# Patient Record
Sex: FEMALE | Race: WHITE | NOT HISPANIC OR LATINO | Employment: OTHER | URBAN - METROPOLITAN AREA
[De-identification: names, ages, dates, MRNs, and addresses within clinical notes are randomized per-mention and may not be internally consistent; named-entity substitution may affect disease eponyms.]

---

## 2022-04-06 ENCOUNTER — OFFICE VISIT (OUTPATIENT)
Dept: NEPHROLOGY | Facility: CLINIC | Age: 72
End: 2022-04-06
Payer: MEDICARE

## 2022-04-06 VITALS
SYSTOLIC BLOOD PRESSURE: 114 MMHG | BODY MASS INDEX: 41.45 KG/M2 | HEART RATE: 62 BPM | OXYGEN SATURATION: 96 % | WEIGHT: 233.94 LBS | HEIGHT: 63 IN | TEMPERATURE: 97 F | DIASTOLIC BLOOD PRESSURE: 68 MMHG

## 2022-04-06 DIAGNOSIS — N25.81 SECONDARY HYPERPARATHYROIDISM OF RENAL ORIGIN: ICD-10-CM

## 2022-04-06 DIAGNOSIS — N04.9 NEPHROTIC SYNDROME: ICD-10-CM

## 2022-04-06 DIAGNOSIS — N18.32 ANEMIA IN STAGE 3B CHRONIC KIDNEY DISEASE: ICD-10-CM

## 2022-04-06 DIAGNOSIS — E11.29 TYPE 2 DIABETES MELLITUS WITH OTHER DIABETIC KIDNEY COMPLICATION, WITHOUT LONG-TERM CURRENT USE OF INSULIN: ICD-10-CM

## 2022-04-06 DIAGNOSIS — E87.5 HYPERKALEMIA: ICD-10-CM

## 2022-04-06 DIAGNOSIS — I10 ESSENTIAL HYPERTENSION: ICD-10-CM

## 2022-04-06 DIAGNOSIS — I13.10 CARDIORENAL SYNDROME WITH RENAL FAILURE, STAGE 1-4 OR UNSPECIFIED CHRONIC KIDNEY DISEASE, WITHOUT HEART FAILURE: ICD-10-CM

## 2022-04-06 DIAGNOSIS — M1A.0720 IDIOPATHIC CHRONIC GOUT OF LEFT FOOT WITHOUT TOPHUS: ICD-10-CM

## 2022-04-06 DIAGNOSIS — N18.32 STAGE 3B CHRONIC KIDNEY DISEASE: Primary | ICD-10-CM

## 2022-04-06 DIAGNOSIS — D63.1 ANEMIA IN STAGE 3B CHRONIC KIDNEY DISEASE: ICD-10-CM

## 2022-04-06 PROCEDURE — 99214 OFFICE O/P EST MOD 30 MIN: CPT | Mod: ,,, | Performed by: INTERNAL MEDICINE

## 2022-04-06 PROCEDURE — 99214 PR OFFICE/OUTPT VISIT, EST, LEVL IV, 30-39 MIN: ICD-10-PCS | Mod: ,,, | Performed by: INTERNAL MEDICINE

## 2022-04-06 RX ORDER — ROSUVASTATIN CALCIUM 10 MG/1
10 TABLET, COATED ORAL NIGHTLY
COMMUNITY
Start: 2022-02-05

## 2022-04-06 RX ORDER — FUROSEMIDE 80 MG/1
80 TABLET ORAL DAILY
COMMUNITY
Start: 2021-11-13 | End: 2022-04-06 | Stop reason: ALTCHOICE

## 2022-04-06 RX ORDER — METOPROLOL TARTRATE 25 MG/1
25 TABLET, FILM COATED ORAL 2 TIMES DAILY
COMMUNITY
Start: 2022-03-20 | End: 2022-09-21 | Stop reason: ALTCHOICE

## 2022-04-06 RX ORDER — SEMAGLUTIDE 1.34 MG/ML
1 INJECTION, SOLUTION SUBCUTANEOUS
COMMUNITY
Start: 2022-03-11 | End: 2022-09-21 | Stop reason: ALTCHOICE

## 2022-04-06 RX ORDER — ALLOPURINOL 100 MG/1
100 TABLET ORAL DAILY
COMMUNITY
Start: 2022-03-08 | End: 2023-03-21 | Stop reason: ALTCHOICE

## 2022-04-06 RX ORDER — GLIPIZIDE 5 MG/1
5 TABLET ORAL 2 TIMES DAILY
COMMUNITY
Start: 2022-01-28

## 2022-04-06 RX ORDER — SOTALOL HYDROCHLORIDE 120 MG/1
120 TABLET ORAL 2 TIMES DAILY
COMMUNITY
Start: 2022-02-28 | End: 2023-03-21 | Stop reason: ALTCHOICE

## 2022-04-06 RX ORDER — SILDENAFIL CITRATE 20 MG/1
20 TABLET ORAL 3 TIMES DAILY
COMMUNITY
Start: 2022-02-14

## 2022-04-06 RX ORDER — CLOPIDOGREL BISULFATE 75 MG/1
75 TABLET ORAL DAILY
COMMUNITY
Start: 2022-01-19 | End: 2023-03-21 | Stop reason: ALTCHOICE

## 2022-04-06 RX ORDER — LEVOTHYROXINE SODIUM 150 UG/1
150 TABLET ORAL DAILY
COMMUNITY
Start: 2022-02-14

## 2022-04-06 RX ORDER — PRENATAL VIT CALC,IRON,FOLIC
1 TABLET ORAL
COMMUNITY
Start: 2021-07-20

## 2022-04-06 RX ORDER — AMLODIPINE BESYLATE 10 MG/1
10 TABLET ORAL DAILY
COMMUNITY
Start: 2022-02-28 | End: 2023-03-21 | Stop reason: ALTCHOICE

## 2022-04-06 RX ORDER — RIVAROXABAN 20 MG/1
20 TABLET, FILM COATED ORAL DAILY
COMMUNITY
Start: 2022-01-16 | End: 2023-03-21 | Stop reason: ALTCHOICE

## 2022-04-06 RX ORDER — AZILSARTAN KAMEDOXOMIL 80 MG/1
1 TABLET ORAL DAILY
COMMUNITY
Start: 2022-03-14 | End: 2022-09-21 | Stop reason: ALTCHOICE

## 2022-04-06 NOTE — PATIENT INSTRUCTIONS
The patient is provided with her current level of kidney function, and is asked to return for follow up in 4 months with repeat kidney lab work done b efore the visit.

## 2022-04-06 NOTE — PROGRESS NOTES
Pt Name: Gloria Berrios  Pt : 1950  Pt MRN: [id number]    Date: 2022    Reason for visit:     Follow-up office visit for Chronic Kidney Disease  Serum creatinine 1.71, GFR 30, CKD stage 3.    Chief Complaint:     The patient denies any complaints today.  Patient had gastric sleeve on Tuesday May 18,2021.    Assessment & Plan:       Problem #1. Type II Diabetes Mellitus    Assessment:    Uncontrolled again with A1c increased to 6.1 %     Plan:    Diabetes managements and nutritional support as per Ms. Valerie Waldron, [facility]. Repeat A1c in 3 months     Problem #2. Stage I Essential Hypertension    Assessment: With the control status uncertain the patient reporting systolics in the 120 -130 range.    Plan: 2 g sodium diet, furosemide 40 mg by mouth as needed, metoprolol 25 mg by mouth twice daily, sotalol 80 mg by mouth twice daily..     Problem #3. Gout    Assessment: Asymptomatic     Plan: Allopurinol 100 mg daily. Repeat uric acid level time to time as indicated.     Problem #4. Type II Right Heart Cardiorenal Syndrome    Assessment: Asymptomatic     Plan: The patient is continued encouraged to keep her appointments with the congestive heart failure clinic, and is advised that 50 ounces of fluid a day should suffice. 2 g sodium diet, furosemide 80 mg by mouth daily, sildenafil 20 mg by mouth 3 times daily.     Problem #5. Chronic Kidney Disease Stage IV    Assessment: With a predictable and progressive decline in kidney function now that the furosemide was restarted. How much of the decrease in kidney function that is due to the effects of the furosemide can be recovered will remain to be seen.    Plan: Conservative, non-dialysis, nephrologic managements. She is asked to change the furosemide to only as needed again. Return for follow-up in 4 months with repeat kidney lab work done before the visit.     Problem #6. Secondary Hyperparathyroidism of Renal Origin    Assessment: Static and  asymptomatic    Plan: Repeat calcium, phosphorus, albumin, and intact PTH level prior to the visit in 4 months.    Problem #7. Nephrotic Syndrome    Assessment: Asymptomatic     Plan: 2 g sodium diet, furosemide 80 mg by mouth daily. Repeat random urine protein/creatinine from time to time as indicated.     Problem #8. Iron Deficiency    Assessment: Resolved.    Plan: She is told that she may stop taking the ferrous sulfate. Repeat serum iron and ferritin prior to the visit in 4 months.    Problem #9. Hyperkalemia    Assessment: Needful of the use of a potassium lowering agent, especially once she has had her bariatric surgery and consuming the protein filled dietary supplements.    Plan: Patiromer 8.4 gm by mouth daily. Repeat serum potassium before the visit in 4 months.         History of the Present Illness:    This is [id number] outpatient visit at the [facility] for this 71 y.o. woman referred by Dr. Blaine Askew following a hospitalization in October 2018 at [facility], which this provider was asked to provide nephrologic input into the management of severe anasarca accompanying her underlying diabetic nephropathy with nephrotic syndrome and cardiopulmonary difficulties related to moderately advanced pulmonary hypertension.    Around the end of 2021 and the first week or so 2022, the patient had been feeling unwell and experiencing symptoms similar to those that she experiences when her atrial fibrillation occurs. These include feeling generally unwell and weaker than usual with less energy. She had discussed the problem with Ms. Vargas, her PCP, and was encouraged to see her cardiologist. She underwent a stress test, echocardiogram, and carotid ultrasound with the principal finding that her pulmonary artery pressures had increased. She was encouraged to begin taking furosemide 40 mg 3 times a week and has had a beneficial resolution of the swelling in her lower extremities that had  developed when she began eating processed foods with higher salt content about 6 months ago. She does think that she is feeling a bit better now but continues to experience chest pressure and feeling listened satisfactorily well when the atrial fibrillation is operative.    In the clinic today for follow up of the status of her kidney function, she does not have absent appetite, nausea, chest pain, shortness of breath, lying flat to sleep at night, absent energy, swelling, or any trouble thinking.       From the standpoint of the risk factors for the development of a kidney function problem, the patient has long-standing Type 2 diabetes mellitus with fully developed diabetic nephropathy and the nephrotic syndrome.  At the same time, she has a contribution to her kidney dysfunction from long-standing hypertension.      Past Medical History:      Anemia    Arrhythmia    Atrial fibrillation    CHF (congestive heart failure)    Chronic kidney disease    Colon polyp 13   4 TUBULAR ADENOMAS    Coronary artery disease    Diabetes mellitus    Heart murmur    History of thyroid disease    Hyperlipidemia   pure hypercholestermia    Hypertension   Pure essential    Lymphoma    Pulmonary HTN    Sleep apnea   cpap at night     Past Surgical History:      CARDIAC CATHETERIZATION     SECTION    COLONOSCOPY     COLONOSCOPY 2013    DILATION AND CURETTAGE OF UTERUS    ESOPHAGOGASTRODUODENOSCOPY 2013    heart stent 2018    LAPAROSCOPIC GASTRECTOMY SLEEVE AND HIATAL HERNIA REPAIR 2021    CA CORON CATH PLACEMNT,ANGIO,NO LH CATH    TOTAL THYROIDECTOMY 2019     Family History      Heart disease Mother    Diabetes Mother    Heart failure Mother    Kidney disease Mother    Hypertension Mother    Hyperlipidemia Mother    Obesity Mother    Heart disease Father    Diabetes Father    Hypertension Father    Hyperlipidemia Father    Heart attack Father    Obesity  Father    Diabetes Paternal Aunt    Hypertension Maternal Grandfather    Heart disease Maternal Grandfather    Heart attack Brother    Diabetes Brother    Heart disease Brother    Diabetes Sister    Colon cancer Neg Hx    Stomach cancer Neg Hx     Social History     Substance and Sexual Activity   Alcohol Use No     Substance and Sexual Activity   Drug Use No     Substance and Sexual Activity   Sexual Activity Yes    Partners: Male   reports being sexually active and has had partner(s) who are male.  Social History     Tobacco Use   Smoking Status Former Smoker    Packs/day: 1.00    Years: 10.00    Pack years: 10.00    Types: Cigarettes    Quit date: 1985    Years since quittin.3   Smokeless Tobacco Never Used     Allergies:    Allergies   Allergen Reactions    Nitroglycerin Other (See Comments)   Pulmonologist told her not to ever take, contraindicated with Revatio    Diflucan [Fluconazole]     Current Outpatient Medications:      ACCU-CHEK SOFTCLIX LANCETS lancets, USE TO TEST EVERY DAY, Disp: 100 each, Rfl: 5   allopurinoL (ZYLOPRIM) 100 MG tablet, TAKE 1 TABLET BY MOUTH EVERY DAY, Disp: 90 tablet, Rfl: 1   amLODIPine (NORVASC) 10 MG tablet, Take 10 mg by mouth daily, Disp: , Rfl:    calcium citrate-vitamin D (CITRACAL +D) 200 mg-6.25 mcg (250 unit) Tablet, Take one tablet by mouth 2 (two) times daily Indications: osteoporosis, a condition of weak bones, low vitamin D levels, Disp: 180 tablet, Rfl: 3   clopidogrel (PLAVIX) 75 mg tablet, Take one tablet (75 mg total) by mouth daily, Disp: 60 tablet, Rfl: 3   denosumab (PROLIA) 60 mg/mL Syringe, Inject 1 mL (60 mg total) into the skin every 6 (six) months, Disp: 1 mL, Rfl: 0   Edarbi 80 mg Tablet, Take one tablet (80 mg total) by mouth daily, Disp: , Rfl:    flash glucose scanning reader (FreeStyle Elsa 2 [place]) Misc, 1 Units by Misc.(Non-Drug; Combo Route) route 3 (three) times daily before meals, Disp: 1 each, Rfl: 0   flash  glucose sensor (FreeStyle Elsa 2 Sensor) Kit, 1 Units by Misc.(Non-Drug; Combo Route) route every 14 (fourteen) days, Disp: 2 kit, Rfl: 11   furosemide (LASIX) 40 MG tablet, Take 40 mg by mouth every other day , Disp: , Rfl:    glipiZIDE (GLUCOTROL) 5 MG tablet, TAKE ONE HALF TABLET (2.5 MG TOTAL) BY MOUTH BEFORE BREAFAST, Disp: 45 tablet, Rfl: 1   lancing device with lancets Kit, Daily use. Dx: e11.9, Disp: 100 each, Rfl: 5   levothyroxine (SYNTHROID) 175 MCG tablet, Take one tablet (175 mcg total) by mouth every morning, Disp: 90 tablet, Rfl: 3   metoprolol (LOPRESSOR) 50 MG tablet, Take 25 mg by mouth 2 (two) times daily , Disp: , Rfl:    multivit-iron-FA-calcium-mins (THERA-M PLUS) 9 mg iron-400 mcg Tablet, Take 1 tablet by mouth daily, Disp: , Rfl:    patiromer calcium sorbitex (VELTASSA) 8.4 gram powder, Take 8.4 g by mouth daily Indications: high levels of potassium in the blood, Disp: 90 each, Rfl: 3   rivaroxaban (XARELTO) 20 mg Tablet, Take one tablet (20 mg total) by mouth daily with dinner, Disp: 60 tablet, Rfl: 3   rosuvastatin (CRESTOR) 10 MG tablet, , Disp: , Rfl:    semaglutide (Ozempic) 1 mg/dose (4 mg/3 mL) Pen Injector injection, Inject one mg into the skin every 7 days, Disp: 9 mL, Rfl: 3   sildenafiL, pulm.hypertension, (REVATIO) 20 mg tablet, Take one tablet (20 mg total) by mouth 3 (three) times daily, Disp: 270 tablet, Rfl: 3   sotaloL (BETAPACE) 80 MG tablet, , Disp: , Rfl:    treprostinil (TYVASO) 1.74 mg/2.9 mL (0.6 mg/mL) Solution for Nebulization neb solution, Inhale 9 puffs into the lungs 4 (four) times daily , Disp: , Rfl:     ROS:     Constitutional: Denies fever or chills   Eyes: Denies change in visual acuity   HENT: Denies nasal congestion or sore throat   Respiratory: As in the history of the present illness.   Cardiovascular: As in the history of the present illness.   GI: As in the history of the present illness.   Musculoskeletal: Denies back pain or joint pain  "  Integument: Denies rash   Neurologic: Denies headache, focal weakness or sensory changes   Endocrine: Denies polyuria or polydipsia   Lymphatic: Denies swollen glands   Psychiatric: Denies depression or anxiety      Physical Exam:     Vitals:   BP: 129/86   Pulse: 88   Weight: (!) 240 lb (108.9 kg)   Height: 5' 3" (1.6 m)     Body mass index is 42.51 kg/m².     Constitutional:  Well developed, well nourished, and in no acute distress   Eyes:  PERRLA, conjunctiva normal   HENT:  Atraumatic, external ears normal, nose normal.  Neck: There is no jugular venous distension or thyromegaly.   Respiratory:  No respiratory distress, normal breath sounds, no rales, no wheezing   Cardiovascular:  Normal rate,and a regular rhythm, a 2/6 to 3/6 systolic ejection murmur at the lower left sternal border., no gallops, no rub, and trace left ankle edema.  GI:  Normal bowel sounds.  Musculoskeletal:  No deformities.   Neurologic:  Alert & oriented x 3, CN 2-12 normal, normal motor function, and no asterixis.   Psychiatric:  Speech and behavior appropriate.       Labs/Tests:      Sodium 136 - 147 mmol/L 143    Potassium 3.5 - 5.1 mmol/L 4.5    Chloride 98 - 107 mmol/L 110 High     CO2 20 - 31 mmol/L 25    Glucose 70 - 99 mg/dL 134 High     BUN 9 - 23 mg/dL 56 High     Creatinine 0.55 - 1.02 mg/dL 1.63 High     Calcium 8.3 - 10.6 mg/dL 9.5    Phosphorus Level 2.4 - 5.1 mg/dL 3.2    Albumin Level 3.2 - 4.8 g/dL 4.0    eGFR >90 mL/min/1.73m2 31 Low     eGFR  >90 mL/min/1.73m2 36 Low         Follow Up:     Return for follow up in 4 months with repeat kidney lab work done before the visit.      This note was created using the voice recognition software currently available to the Medical Staff of the [facility] and its health care facilities. All of the best efforts undertaken to edit the product of that use shall necessarily fall short from time to time. Viewers and reviewers of the product of its use are " encouraged to contact this provider for clarification when, and if, the product message is unclear.

## 2022-09-21 ENCOUNTER — OFFICE VISIT (OUTPATIENT)
Dept: NEPHROLOGY | Facility: CLINIC | Age: 72
End: 2022-09-21
Payer: MEDICARE

## 2022-09-21 VITALS
BODY MASS INDEX: 39.51 KG/M2 | HEART RATE: 47 BPM | HEIGHT: 63 IN | OXYGEN SATURATION: 92 % | DIASTOLIC BLOOD PRESSURE: 50 MMHG | WEIGHT: 223 LBS | SYSTOLIC BLOOD PRESSURE: 120 MMHG

## 2022-09-21 DIAGNOSIS — E11.22 TYPE 2 DIABETES MELLITUS WITH STAGE 3B CHRONIC KIDNEY DISEASE, WITHOUT LONG-TERM CURRENT USE OF INSULIN: ICD-10-CM

## 2022-09-21 DIAGNOSIS — D63.1 ANEMIA IN CHRONIC KIDNEY DISEASE, UNSPECIFIED CKD STAGE: ICD-10-CM

## 2022-09-21 DIAGNOSIS — I50.812 CHRONIC RIGHT-SIDED HEART FAILURE: ICD-10-CM

## 2022-09-21 DIAGNOSIS — I10 PRIMARY HYPERTENSION: ICD-10-CM

## 2022-09-21 DIAGNOSIS — N04.9 NEPHROTIC SYNDROME: ICD-10-CM

## 2022-09-21 DIAGNOSIS — M1A.0720 IDIOPATHIC CHRONIC GOUT OF LEFT FOOT WITHOUT TOPHUS: ICD-10-CM

## 2022-09-21 DIAGNOSIS — N18.32 STAGE 3B CHRONIC KIDNEY DISEASE: Primary | ICD-10-CM

## 2022-09-21 DIAGNOSIS — N25.81 SECONDARY HYPERPARATHYROIDISM OF RENAL ORIGIN: ICD-10-CM

## 2022-09-21 DIAGNOSIS — N18.32 TYPE 2 DIABETES MELLITUS WITH STAGE 3B CHRONIC KIDNEY DISEASE, WITHOUT LONG-TERM CURRENT USE OF INSULIN: ICD-10-CM

## 2022-09-21 DIAGNOSIS — N18.9 ANEMIA IN CHRONIC KIDNEY DISEASE, UNSPECIFIED CKD STAGE: ICD-10-CM

## 2022-09-21 PROBLEM — E11.29 TYPE 2 DIABETES MELLITUS WITH KIDNEY COMPLICATION, WITHOUT LONG-TERM CURRENT USE OF INSULIN: Status: ACTIVE | Noted: 2022-09-21

## 2022-09-21 PROCEDURE — 99214 OFFICE O/P EST MOD 30 MIN: CPT | Mod: ,,, | Performed by: INTERNAL MEDICINE

## 2022-09-21 PROCEDURE — 99214 PR OFFICE/OUTPT VISIT, EST, LEVL IV, 30-39 MIN: ICD-10-PCS | Mod: ,,, | Performed by: INTERNAL MEDICINE

## 2022-09-21 RX ORDER — VALSARTAN 160 MG/1
160 TABLET ORAL DAILY
COMMUNITY
Start: 2022-08-17

## 2022-09-21 RX ORDER — FERROUS SULFATE 325(65) MG
65 TABLET ORAL DAILY
COMMUNITY

## 2022-09-21 RX ORDER — CALC/MAG/B COMPLEX/D3/HERB 61
15 TABLET ORAL DAILY
COMMUNITY
End: 2023-03-21 | Stop reason: ALTCHOICE

## 2022-09-21 NOTE — PATIENT INSTRUCTIONS
The patient has been provided with their current level of kidney function including eGFR and creatinine, and she is asked to return for follow-up, this time came in 6 months with repeat kidney lab work done before the visit..     We discussed the potential for common complications of CKD including anemia, electrolyte abnormalities, abnormal fluid balance, mineral bone disease and malnutrition.     We discussed strategies to slow the progression of their kidney disease including:  Avoid nephrotoxic agents. Avoid over-the-counter and prescription NSAIDs (Ibuprofren, Motrin, Naproxyn, Aleve, Mobic, Celebrex, Toradol, Advil). All of these can worsen kidney function, elevate BP, cause fluid retention/swelling and elevate potassium. Avoid iodine contrast agents and gadolinium, which can worsen kidney function and/or cause kidney failure. Avoid phosphosoda bowel preps which can worsen kidney function.  Work to improve modifiable risk factors. Aim for good control of blood glucose without episodes of hypoglycemia. Notify the provider managing your diabetes if your blood glucose < 60. Aim for good blood pressure control without episodes of hypotension. Call the office if your systolic blood pressure is consistently < 110. Aim for good control of your cholesterol.  AIC goal <7.0  BP goal <130/80  LDL chol goal <70        Keeping these in goal range will help prevent progression of cardiovascular disease and chronic kidney disease.     We discussed dietary modifications:  Low sodium diet: 2 gm/d or less  Limit/avoid high potassium foods  Avoid potassium containing salt substitutes  Limit/avoid high phosphorus foods  Limit daily protein intake to 0.8-1 gm/kg of your ideal body weight.     We discussed lifestyle modifications:  Make sure you are drinking plenty of fluids--64 ounces (1/2 gallon) daily  Exercise at least 30 minutes 5 x per week (total 150 minutes per week), example brisk walking  Achieve and maintain a healthy  weight (BMI 20-25)  Limit alcohol consumption to <2 drinks per day  Stop smoking  Make sure you stay current on important vaccines-- pneumonia vaccines (Pneumovax and Prevnar), flu vaccine, Hepatitis B (especially patients nearing renal replacement therapy and planning hemodialysis) and Covid-19 vaccine.      Recommendations:  Monitor your BP at home daily and record.  Bring readings to your next appt.  Call the office if your BP is persistently >130/80.  Seek urgent medical attention with signs and symptoms of uremia - extreme weakness, fatigue, confusion, anorexia, metallic taste in mouth, hiccoughs, cramping, itching, chest pain, swelling, or trouble sleeping.

## 2022-09-21 NOTE — PROGRESS NOTES
Pt Name: Gloria Berrios  Pt : 1950  Pt MRN: [id number]    Date:  2022    Reason for visit:     Follow-up office visit for Chronic Kidney Disease  Serum creatinine 1.28, GFR 42, CKD stage 3b.    Chief Complaint:     The patient denies any complaints today.  Patient had gastric sleeve on Tuesday May 18,2021.    Assessment & Plan:     Problem #1.  Type II Diabetes Mellitus     Assessment:    Uncontrolled again with A1c increased to 6.1 %.   Plan:    Diabetes managements and nutritional support as per Ms. Valerie Waldron, [facility].     She and Ms. Waldron are asked to consider starting an SGLT2 inhibitor.     Repeat A1c in 6 months      Problem #2.  Stage I Essential Hypertension     Assessment:    With the control status uncertain the patient reporting systolics in the 135 - 148 range.      Plan:    2 g sodium diet, azilsartan 80 mg daily, furosemide 80 mg by mouth as needed, metoprolol 25 mg by mouth twice daily, sotalol 80 mg by mouth twice daily..      Problem #3.  Gout     Assessment:    Asymptomatic   Plan:    Allopurinol 100 mg daily.     Repeat uric acid level time to time as indicated.      Problem #4.  Type II Right Heart Cardiorenal Syndrome           Assessment:    Asymptomatic   Plan:    The patient is continued encouraged to keep her   appointments with the congestive heart failure clinic, and is advised that 50 ounces of fluid a day should suffice.     2 g sodium diet, furosemide 80 mg by mouth daily as needed, sildenafil 20 mg by mouth 3 times daily.      Problem #5.  Chronic Kidney Disease Stage 3b     Assessment:    With a significant improvement in renal function since the gastric sleeve creation.       Plan:    Conservative, non-dialysis, nephrologic managements.    Continue renal nutrition and fluid managements.     Continue to provide instruction and reading material regarding the chronic kidney disease condition, and especially its associated cardiovascular risks.     Return for  follow-up in 6 months with repeat kidney lab work done before the visit.       Problem #6.  Anemia in Chronic Kidney Disease     Assessment:      Asymptomatic   Plan:    Repeat serum iron and ferritin.  Replete iron if decreased.    Repeat hemoglobin prior to the visit in 6 months.        Problem #7.  Secondary Hyperparathyroidism of Renal Origin                  Assessment: Static and asymptomatic                               Plan: Repeat calcium, phosphorus, albumin, and intact PTH level                                      prior to the visit in 6 months.     Problem #8.  Nephrotic Syndrome     Assessment:      Asymptomatic   Plan:    2 g sodium diet, furosemide 80 mg by mouth daily as needed.     Repeat random urine protein/creatinine from time to time as indicated.            HPI:     This is [id number] outpatient visit at the [facility] in [facility] for this 72 y.o. woman referred by Dr. Blaine Askew following a hospitalization in October 2018 at [facility], during which this provider was asked to provide nephrologic input into the management of severe anasarca accompanying her underlying diabetic nephropathy with nephrotic syndrome and cardiopulmonary difficulties related to moderately advanced pulmonary hypertension.     She ihas had a  gastric sleeve created by Dr. Seymour on 05/18/2021..       At presentation to the clinic today for follow up of the status of her kidney function, she does not have azotemic symptoms.  Specifically, she does not have absent appetite, nausea, chest pain, shortness of breath, lying flat to sleep at night, absent energy, swelling, or any trouble thinking.           From the standpoint of the risk factors for the development of a kidney function problem, the patient has long-standing Type 2 diabetes mellitus with fully developed diabetic nephropathy and the nephrotic syndrome.  At the same time, she has a contribution to her kidney dysfunction from long-standing  hypertension.       History:           Past Medical History:   Diagnosis Date    Anemia      Arrhythmia      Atrial fibrillation      CHF (congestive heart failure)      Chronic kidney disease      Colon polyp 13     4 TUBULAR ADENOMAS    Coronary artery disease      Diabetes mellitus      Heart murmur      History of thyroid disease      Hyperlipidemia       pure hypercholestermia    Hypertension       Pure essential    Lymphoma      Pulmonary HTN      Sleep apnea       cpap at night            Past Surgical History:   Procedure Laterality Date    CARDIAC CATHETERIZATION         SECTION        COLONOSCOPY       COLONOSCOPY   2013    DILATION AND CURETTAGE OF UTERUS        ESOPHAGOGASTRODUODENOSCOPY   2013    heart stent   2018    LAPAROSCOPIC GASTRECTOMY SLEEVE AND HIATAL HERNIA REPAIR   2021    [place] CORON CATH PLACEMNT,ANGIO,NO LH CATH        TOTAL THYROIDECTOMY   2019            Family History   Problem Relation Age of Onset    Heart disease Mother      Diabetes Mother      Heart failure Mother      Kidney disease Mother      Hypertension Mother      Hyperlipidemia Mother      Obesity Mother      Heart disease Father      Diabetes Father      Hypertension Father      Hyperlipidemia Father      Heart attack Father      Obesity Father      Diabetes Paternal Aunt      Hypertension Maternal Grandfather      Heart disease Maternal Grandfather      Heart attack Brother      Diabetes Brother      Heart disease Brother      Diabetes Sister      Colon cancer Neg Hx      Stomach cancer Neg Hx        Social History          Substance and Sexual Activity   Alcohol Use No      Social History          Substance and Sexual Activity   Drug Use No      Social History           Substance and Sexual Activity   Sexual Activity Yes    Partners: Male     reports being sexually active and has had partner(s) who are Male.  Social History           Tobacco Use   Smoking Status Former Smoker     "Packs/day: 1.00    Years: 10.00    Pack years: 10.00    Types: Cigarettes    Quit date: 1985    Years since quittin.1   Smokeless Tobacco Never Used         Allergies:           Allergies   Allergen Reactions    Nitroglycerin Other (See Comments)       Pulmonologist told her not to ever take, contraindicated with Revatio    Diflucan [Fluconazole]              Current Outpatient Medications:     ACCU-CHEK SOFTCLIX LANCETS lancets, USE TO TEST EVERY DAY, Disp: 100 each, Rfl: 5    allopurinoL (ZYLOPRIM) 100 MG tablet, TAKE 1 TABLET BY MOUTH EVERY DAY, Disp: 90 tablet, Rfl: 1    amLODIPine (NORVASC) 10 MG tablet, Take 10 mg by mouth daily, Disp: , Rfl:     calcium citrate-vitamin D (CITRACAL +D) 200 mg-6.25 mcg (250 unit) Tablet, Take one tablet by mouth 2 (two) times daily Indications: osteoporosis, a condition of weak bones, low vitamin D levels, Disp: 180 tablet, Rfl: 3    clopidogrel (PLAVIX) 75 mg tablet, Take one tablet (75 mg total) by mouth daily, Disp: 60 tablet, Rfl: 3    denosumab (PROLIA) 60 mg/mL Syringe, Inject 1 mL (60 mg total) into the skin every 6 (six) months, Disp: 1 mL, Rfl: 0    Edarbi 80 mg Tablet, Take one tablet (80 mg total) by mouth daily, Disp: , Rfl:     ferrous sulfate 325 (65 FE) MG tablet, Take 325 mg by mouth daily with breakfast, Disp: , Rfl:     flash glucose scanning reader (FreeStyle Elsa 2 [place]) Misc, 1 Units by Misc.(Non-Drug; Combo Route) route 3 (three) times daily before meals, Disp: 1 each, Rfl: 0    flash glucose sensor (FreeStyle Elsa 2 Sensor) Kit, 1 Units by Misc.(Non-Drug; Combo Route) route every 14 (fourteen) days, Disp: 2 kit, Rfl: 11    glipiZIDE (GlucotroL) 5 MG tablet, Take one half tablet (2.5 mg total) by mouth before breafast, Disp: 30 tablet, Rfl: 1    insulin needles, disposable, (NovoTwist) 32 x 1/5 " Needle, DAILY USE WITH TRESIBA, Disp: 100 each, Rfl: 5    lancing device with lancets Kit, Daily use. Dx: e11.9, Disp: 100 each, Rfl: 5    " levothyroxine (SYNTHROID) 175 MCG tablet, Take one tablet (175 mcg total) by mouth every morning, Disp: 90 tablet, Rfl: 3    metoprolol (LOPRESSOR) 50 MG tablet, Take 25 mg by mouth 2 (two) times daily , Disp: , Rfl:     multivit-iron-FA-calcium-mins (THERA-M PLUS) 9 mg iron-400 mcg Tablet, Take 1 tablet by mouth daily, Disp: , Rfl:     ONETOUCH ULTRA BLUE TEST STRIP Strip, USE TO TEST THREE TIMES DAILY AND AS NEEDED, Disp: 100 strip, Rfl: 11    patiromer calcium sorbitex (VELTASSA) 8.4 gram powder, Take 8.4 g by mouth daily Indications: high levels of potassium in the blood, Disp: 90 each, Rfl: 3    rivaroxaban (XARELTO) 20 mg Tablet, Take one tablet (20 mg total) by mouth daily with dinner, Disp: 60 tablet, Rfl: 3    rosuvastatin (CRESTOR) 10 MG tablet, , Disp: , Rfl:     semaglutide (Ozempic) 1 mg/dose (4 mg/3 mL) Pen Injector injection, Inject one mg into the skin every 7 days, Disp: 9 mL, Rfl: 3    sildenafiL, pulm.hypertension, (REVATIO) 20 mg tablet, Take one tablet (20 mg total) by mouth 3 (three) times daily, Disp: 270 tablet, Rfl: 3    sotaloL (BETAPACE) 80 MG tablet, , Disp: , Rfl:     treprostinil (TYVASO) 1.74 mg/2.9 mL (0.6 mg/mL) Solution for Nebulization neb solution, Inhale 9 puffs into the lungs 4 (four) times daily , Disp: , Rfl:     furosemide (LASIX) 80 MG tablet, Take 80 mg by mouth as needed , Disp: , Rfl:          ROS:      Constitutional:  Denies fever or chills   Eyes:  Denies change in visual acuity   HENT:  Denies nasal congestion or sore throat   Respiratory:  As in the history of the present illness.   Cardiovascular:  As in the history of the present illness.   GI:  As in the history of the present illness.    Musculoskeletal:  Denies back pain or joint pain   Integument:  Denies rash   Neurologic:  Denies headache, focal weakness or sensory changes   Endocrine:  Denies polyuria or polydipsia   Lymphatic:  Denies swollen glands   Psychiatric:  Denies depression or anxiety.         Physical Exam:      Vitals:       Constitutional:  Well developed, well nourished, and in no acute distress   Eyes:  PERRLA, conjunctiva normal   HENT:  Atraumatic, external ears normal, nose normal.  Neck: There is no jugular venous distension or thyromegaly.   Respiratory:  No respiratory distress, normal breath sounds, no rales, no wheezing   Cardiovascular:  Normal rate,and a regular rhythm, a 2/6 to 3/6 systolic ejection murmur at the lower left sternal border., no gallops, no rub, and bilateral pretibial edema.  GI:  Normal bowel sounds.  Musculoskeletal:  No deformities.   Neurologic:  Alert & oriented x 3, CN 2-12 normal, normal motor function, and no asterixis.   Psychiatric:  Speech and behavior appropriate.         Labs/Tests:    Date:  09/15/2022    Na/K/Cl/CO2 = 38/4.9/110/18  BUN/Creat/GFR = 47/1.28/42   Ca/Phos/Alb/PTH = 9.1/4.1/3.7/41  U Prot/Creat = 0.9  Gluc/A1c = 121/6.0  Hgb = 9.9      Follow Up:     Return for follow up in 6 months with repeat kidney lab work done before the visit.      This note was created using the voice recognition software currently available to the Medical Staff of the [facility] and its health care facilities. All of the best efforts undertaken to edit the product of that use shall necessarily fall short from time to time. Viewers and reviewers of the product of its use are encouraged to contact this provider for clarification when, and if, the product message is unclear.

## 2023-03-21 ENCOUNTER — OFFICE VISIT (OUTPATIENT)
Dept: NEPHROLOGY | Facility: CLINIC | Age: 73
End: 2023-03-21
Payer: MEDICARE

## 2023-03-21 VITALS
WEIGHT: 207 LBS | OXYGEN SATURATION: 97 % | DIASTOLIC BLOOD PRESSURE: 94 MMHG | HEIGHT: 63 IN | BODY MASS INDEX: 36.68 KG/M2 | HEART RATE: 57 BPM | SYSTOLIC BLOOD PRESSURE: 131 MMHG

## 2023-03-21 DIAGNOSIS — N25.81 SECONDARY HYPERPARATHYROIDISM OF RENAL ORIGIN: ICD-10-CM

## 2023-03-21 DIAGNOSIS — I12.9 RENAL HYPERTENSION: ICD-10-CM

## 2023-03-21 DIAGNOSIS — E11.22 TYPE 2 DIABETES MELLITUS WITH STAGE 3B CHRONIC KIDNEY DISEASE, WITHOUT LONG-TERM CURRENT USE OF INSULIN: ICD-10-CM

## 2023-03-21 DIAGNOSIS — M1A.0720 IDIOPATHIC CHRONIC GOUT OF LEFT FOOT WITHOUT TOPHUS: ICD-10-CM

## 2023-03-21 DIAGNOSIS — E11.21 DIABETIC NEPHROPATHY ASSOCIATED WITH TYPE 2 DIABETES MELLITUS: ICD-10-CM

## 2023-03-21 DIAGNOSIS — N18.32 STAGE 3B CHRONIC KIDNEY DISEASE: Primary | ICD-10-CM

## 2023-03-21 DIAGNOSIS — D63.1 ANEMIA IN STAGE 3B CHRONIC KIDNEY DISEASE: ICD-10-CM

## 2023-03-21 DIAGNOSIS — I50.812 CHRONIC RIGHT-SIDED HEART FAILURE: ICD-10-CM

## 2023-03-21 DIAGNOSIS — E66.01 MORBID (SEVERE) OBESITY DUE TO EXCESS CALORIES: ICD-10-CM

## 2023-03-21 DIAGNOSIS — N04.9 NEPHROTIC SYNDROME: ICD-10-CM

## 2023-03-21 DIAGNOSIS — N18.32 ANEMIA IN STAGE 3B CHRONIC KIDNEY DISEASE: ICD-10-CM

## 2023-03-21 DIAGNOSIS — N18.32 TYPE 2 DIABETES MELLITUS WITH STAGE 3B CHRONIC KIDNEY DISEASE, WITHOUT LONG-TERM CURRENT USE OF INSULIN: ICD-10-CM

## 2023-03-21 PROCEDURE — 99215 OFFICE O/P EST HI 40 MIN: CPT | Mod: S$GLB,,, | Performed by: INTERNAL MEDICINE

## 2023-03-21 PROCEDURE — 99215 PR OFFICE/OUTPT VISIT, EST, LEVL V, 40-54 MIN: ICD-10-PCS | Mod: S$GLB,,, | Performed by: INTERNAL MEDICINE

## 2023-03-21 RX ORDER — METOPROLOL SUCCINATE 100 MG/1
100 TABLET, EXTENDED RELEASE ORAL DAILY
COMMUNITY
Start: 2022-12-28 | End: 2023-09-25 | Stop reason: ALTCHOICE

## 2023-03-21 RX ORDER — PANTOPRAZOLE SODIUM 40 MG/1
40 TABLET, DELAYED RELEASE ORAL DAILY
COMMUNITY
Start: 2023-02-02

## 2023-03-21 RX ORDER — FEBUXOSTAT 40 MG/1
40 TABLET, FILM COATED ORAL DAILY
COMMUNITY
Start: 2023-03-07

## 2023-03-21 RX ORDER — FUROSEMIDE 20 MG/1
20 TABLET ORAL DAILY
COMMUNITY
Start: 2023-03-19 | End: 2023-09-25

## 2023-03-21 NOTE — PATIENT INSTRUCTIONS
The patient has been provided with her current level of decreased kidney function.  Concurs in having repeat lab work done in 2 months to be certain that the kidney functions not decreased further.  Otherwise, she is asked to return for follow-up in 6 months again with repeat kidney lab work done before the visit.    She continues reminded to strive to take in 50-60 ounces of fluid per day.It is acknowledged that she can not take in more than this because of her severe pulmonary hypertension and tendency to the development of congestive heart failure.

## 2023-03-21 NOTE — PROGRESS NOTES
Pt Name: Gloria Berrios  Pt : 1950  Pt MRN: [id number]    Date:  2022    Reason for visit:     Follow-up office visit for Chronic Kidney Disease  Serum creatinine 1.61, GFR 34, CKD stage 3b.    Chief Complaint:     The patient denies any complaints today.  Patient had gastric sleeve on Tuesday May 18,2021.    Assessment & Plan:     Problem #1.  Type II Diabetes Mellitus     Assessment:    Uncontrolled again with A1c increased to 5.5 %.   Plan:    Diabetes managements and nutritional support as per Ms. Valerie Waldron, [facility].     She could not afford a SGLT2 inhibitor or injectable semaglutide.    Repeat A1c prior to the visit in 6 months      Problem #2.  Stage I Essential Hypertension     Assessment:    Controlled      Plan:    2 g sodium diet, furosemide 20 mg by mouth, metoprolol extended release 100 mg by mouth daily, valsartan 80 mg by mouth daily.      Problem #3.  Gout     Assessment:    Asymptomatic   Plan:    Febuxostat 40 mg by mouth daily.     Repeat uric acid level time to time as indicated.      Problem #4.  Type II Right Heart Cardiorenal Syndrome           Assessment:    Asymptomatic   Plan:    The patient is continued encouraged to keep her   appointments with the congestive heart failure clinic, and is advised that 50 ounces of fluid a day should suffice.     2 g sodium diet, furosemide 20 mg by mouth daily, sildenafil 20 mg by mouth 3 times daily, valsartan 80 mg by mouth daily.      Problem #5.  Chronic Kidney Disease Stage 3b     Assessment:    With a significant improvement in renal function since the gastric sleeve creation.       Plan:    Conservative, non-dialysis, nephrologic managements.    Continue renal nutrition and fluid managements.     Continue to provide instruction and reading material regarding the chronic kidney disease condition, and especially its associated cardiovascular risks.    Repeat renal panel in 2 months to be certain that there has not been a further  decrease in GFR.     Return for follow-up in 6 months with repeat kidney lab work done before the visit.       Problem #6.  Anemia in Chronic Kidney Disease     Assessment:      Asymptomatic   Plan:    Repeat hemoglobin in 2 months.    Repeat hemoglobin prior to the visit in 6 months.     Problem #7.  Secondary Hyperparathyroidism of Renal Origin     Assessment:      Asymptomatic   Plan:    Repeat calcium, phosphorus, albumin, PTH in 2 months.     Repeat calcium, phosphorus, albumin, and intact PTH level prior to the visit in 6 months.      Problem #8.  Nephrotic Syndrome     Assessment:      Asymptomatic   Plan:    2 g sodium diet, furosemide 80 mg by mouth daily as needed.     Repeat random urine protein/creatinine from time to time as indicated.            HPI:     This is the 20th outpatient visit at the [facility] Clinic in [place], for this 73 y.o. woman referred by Dr. Blaine Askew following a hospitalization in October 2018 at [facility], during which this provider was asked to provide nephrologic input into the management of severe anasarca accompanying her underlying diabetic nephropathy with nephrotic syndrome and cardiopulmonary difficulties related to moderately advanced pulmonary hypertension.     She has had a  gastric sleeve created by Dr. Seymour on 05/18/2021.    Her current status is continued influenced by severe pulmonary hypertension, and she is currently experiencing a problem with chronic pruritis that does not respond to any of the available anti-pruritis agents, either OTC or prescribed. The allopurinol was discontinued a few days ago to determine if that could be the offender, Febuxostat has been substituted.           At presentation to the clinic today for follow up of the status of her kidney function, she does not have azotemic symptoms.  Specifically, she does not have absent appetite, nausea, chest pain, shortness of breath, lying flat to sleep at night, absent energy,  swelling, or any trouble thinking.           From the standpoint of the risk factors for the development of a kidney function problem, the patient has long-standing Type 2 diabetes mellitus with fully developed diabetic nephropathy and the nephrotic syndrome.  At the same time, she has a contribution to her kidney dysfunction from long-standing hypertension.       History:           Past Medical History:   Diagnosis Date    Anemia      Arrhythmia      Atrial fibrillation      CHF (congestive heart failure)      Chronic kidney disease      Colon polyp 13     4 TUBULAR ADENOMAS    Coronary artery disease      Diabetes mellitus      Heart murmur      History of thyroid disease      Hyperlipidemia       pure hypercholestermia    Hypertension       Pure essential    Lymphoma      Pulmonary HTN      Sleep apnea       cpap at night            Past Surgical History:   Procedure Laterality Date    CARDIAC CATHETERIZATION         SECTION        COLONOSCOPY       COLONOSCOPY   2013    DILATION AND CURETTAGE OF UTERUS        ESOPHAGOGASTRODUODENOSCOPY   2013    heart stent   2018    LAPAROSCOPIC GASTRECTOMY SLEEVE AND HIATAL HERNIA REPAIR   2021    [place] CORON CATH PLACEMNT,ANGIO,NO LH CATH        TOTAL THYROIDECTOMY   2019            Family History   Problem Relation Age of Onset    Heart disease Mother      Diabetes Mother      Heart failure Mother      Kidney disease Mother      Hypertension Mother      Hyperlipidemia Mother      Obesity Mother      Heart disease Father      Diabetes Father      Hypertension Father      Hyperlipidemia Father      Heart attack Father      Obesity Father      Diabetes Paternal Aunt      Hypertension Maternal Grandfather      Heart disease Maternal Grandfather      Heart attack Brother      Diabetes Brother      Heart disease Brother      Diabetes Sister      Colon cancer Neg Hx      Stomach cancer Neg Hx        Social History          Substance and  Sexual Activity   Alcohol Use No      Social History          Substance and Sexual Activity   Drug Use No      Social History           Substance and Sexual Activity   Sexual Activity Yes    Partners: Male     reports being sexually active and has had partner(s) who are Male.  Social History           Tobacco Use   Smoking Status Former Smoker    Packs/day: 1.00    Years: 10.00    Pack years: 10.00    Types: Cigarettes    Quit date: 1985    Years since quittin.1   Smokeless Tobacco Never Used         Allergies:           Allergies   Allergen Reactions    Nitroglycerin Other (See Comments)       Pulmonologist told her not to ever take, contraindicated with Revatio    Diflucan [Fluconazole]              Current Outpatient Medications:     ACCU-CHEK SOFTCLIX LANCETS lancets, USE TO TEST EVERY DAY, Disp: 100 each, Rfl: 5    allopurinoL (ZYLOPRIM) 100 MG tablet, TAKE 1 TABLET BY MOUTH EVERY DAY, Disp: 90 tablet, Rfl: 1    amLODIPine (NORVASC) 10 MG tablet, Take 10 mg by mouth daily, Disp: , Rfl:     calcium citrate-vitamin D (CITRACAL +D) 200 mg-6.25 mcg (250 unit) Tablet, Take one tablet by mouth 2 (two) times daily Indications: osteoporosis, a condition of weak bones, low vitamin D levels, Disp: 180 tablet, Rfl: 3    clopidogrel (PLAVIX) 75 mg tablet, Take one tablet (75 mg total) by mouth daily, Disp: 60 tablet, Rfl: 3    denosumab (PROLIA) 60 mg/mL Syringe, Inject 1 mL (60 mg total) into the skin every 6 (six) months, Disp: 1 mL, Rfl: 0    Edarbi 80 mg Tablet, Take one tablet (80 mg total) by mouth daily, Disp: , Rfl:     ferrous sulfate 325 (65 FE) MG tablet, Take 325 mg by mouth daily with breakfast, Disp: , Rfl:     flash glucose scanning reader (FreeStyle Elsa 2 [place]) Misc, 1 Units by Misc.(Non-Drug; Combo Route) route 3 (three) times daily before meals, Disp: 1 each, Rfl: 0    flash glucose sensor (FreeStyle Elsa 2 Sensor) Kit, 1 Units by Misc.(Non-Drug; Combo Route) route every 14 (fourteen) days,  "Disp: 2 kit, Rfl: 11    glipiZIDE (GlucotroL) 5 MG tablet, Take one half tablet (2.5 mg total) by mouth before breafast, Disp: 30 tablet, Rfl: 1    insulin needles, disposable, (NovoTwist) 32 x 1/5 " Needle, DAILY USE WITH TRESIBA, Disp: 100 each, Rfl: 5    lancing device with lancets Kit, Daily use. Dx: e11.9, Disp: 100 each, Rfl: 5    levothyroxine (SYNTHROID) 175 MCG tablet, Take one tablet (175 mcg total) by mouth every morning, Disp: 90 tablet, Rfl: 3    metoprolol (LOPRESSOR) 50 MG tablet, Take 25 mg by mouth 2 (two) times daily , Disp: , Rfl:     multivit-iron-FA-calcium-mins (THERA-M PLUS) 9 mg iron-400 mcg Tablet, Take 1 tablet by mouth daily, Disp: , Rfl:     ONETOUCH ULTRA BLUE TEST STRIP Strip, USE TO TEST THREE TIMES DAILY AND AS NEEDED, Disp: 100 strip, Rfl: 11    patiromer calcium sorbitex (VELTASSA) 8.4 gram powder, Take 8.4 g by mouth daily Indications: high levels of potassium in the blood, Disp: 90 each, Rfl: 3    rivaroxaban (XARELTO) 20 mg Tablet, Take one tablet (20 mg total) by mouth daily with dinner, Disp: 60 tablet, Rfl: 3    rosuvastatin (CRESTOR) 10 MG tablet, , Disp: , Rfl:     semaglutide (Ozempic) 1 mg/dose (4 mg/3 mL) Pen Injector injection, Inject one mg into the skin every 7 days, Disp: 9 mL, Rfl: 3    sildenafiL, pulm.hypertension, (REVATIO) 20 mg tablet, Take one tablet (20 mg total) by mouth 3 (three) times daily, Disp: 270 tablet, Rfl: 3    sotaloL (BETAPACE) 80 MG tablet, , Disp: , Rfl:     treprostinil (TYVASO) 1.74 mg/2.9 mL (0.6 mg/mL) Solution for Nebulization neb solution, Inhale 9 puffs into the lungs 4 (four) times daily , Disp: , Rfl:     furosemide (LASIX) 80 MG tablet, Take 80 mg by mouth as needed , Disp: , Rfl:          ROS:      Constitutional:  Denies fever or chills   Eyes:  Denies change in visual acuity   HENT:  Denies nasal congestion or sore throat   Respiratory:  As in the history of the present illness.   Cardiovascular:  As in the history of the present " illness.   GI:  As in the history of the present illness.    Musculoskeletal:  Denies back pain or joint pain   Integument:  Denies rash   Neurologic:  Denies headache, focal weakness or sensory changes   Endocrine:  Denies polyuria or polydipsia   Lymphatic:  Denies swollen glands   Psychiatric:  Denies depression or anxiety.        Physical Exam:      Vitals:       Constitutional:  Well developed, well nourished, and in no acute distress   Eyes:  PERRLA, conjunctiva normal   HENT:  Atraumatic, external ears normal, nose normal.  Neck: There is no jugular venous distension or thyromegaly.   Respiratory:  No respiratory distress, normal breath sounds, no rales, no wheezing   Cardiovascular:  Normal rate,and a regular rhythm, a 2/6 to 3/6 systolic ejection murmur at the lower left sternal border., no gallops, no rub, and bilateral pretibial edema.  GI:  Normal bowel sounds.  Musculoskeletal:  No deformities.   Neurologic:  Alert & oriented x 3, CN 2-12 normal, normal motor function, and no asterixis.   Psychiatric:  Speech and behavior appropriate.         Labs/Tests:    Date:  03/20/2023    Na/K/Cl/CO2 = 140/4.4/104/24  BUN/Creat/GFR = 76/1.61/34   Ca/Phos/Alb/PTH = 9.7/4.3/4.2/76  U Prot/Creat = 3.0  Gluc/A1c = 129/5.5  Hgb = 13.1      Follow Up:     Return for follow up in 6 months with repeat kidney lab work done before the visit.      This note was created using the voice recognition software currently available to the Medical Staff of the [facility] and its health care facilities. All of the best efforts undertaken to edit the product of that use shall necessarily fall short from time to time. Viewers and reviewers of the product of its use are encouraged to contact this provider for clarification when, and if, the product message is unclear.

## 2023-09-25 ENCOUNTER — OFFICE VISIT (OUTPATIENT)
Dept: NEPHROLOGY | Facility: CLINIC | Age: 73
End: 2023-09-25
Payer: MEDICARE

## 2023-09-25 VITALS
HEIGHT: 63 IN | SYSTOLIC BLOOD PRESSURE: 134 MMHG | WEIGHT: 219 LBS | HEART RATE: 75 BPM | OXYGEN SATURATION: 97 % | BODY MASS INDEX: 38.8 KG/M2 | DIASTOLIC BLOOD PRESSURE: 74 MMHG

## 2023-09-25 DIAGNOSIS — N18.32 ANEMIA IN STAGE 3B CHRONIC KIDNEY DISEASE: ICD-10-CM

## 2023-09-25 DIAGNOSIS — N18.32 TYPE 2 DIABETES MELLITUS WITH STAGE 3B CHRONIC KIDNEY DISEASE, WITHOUT LONG-TERM CURRENT USE OF INSULIN: ICD-10-CM

## 2023-09-25 DIAGNOSIS — E11.22 TYPE 2 DIABETES MELLITUS WITH STAGE 3B CHRONIC KIDNEY DISEASE, WITHOUT LONG-TERM CURRENT USE OF INSULIN: ICD-10-CM

## 2023-09-25 DIAGNOSIS — E66.01 MORBID (SEVERE) OBESITY DUE TO EXCESS CALORIES: ICD-10-CM

## 2023-09-25 DIAGNOSIS — E11.21 DIABETIC NEPHROPATHY ASSOCIATED WITH TYPE 2 DIABETES MELLITUS: ICD-10-CM

## 2023-09-25 DIAGNOSIS — N25.81 SECONDARY HYPERPARATHYROIDISM OF RENAL ORIGIN: ICD-10-CM

## 2023-09-25 DIAGNOSIS — D63.1 ANEMIA IN STAGE 3B CHRONIC KIDNEY DISEASE: ICD-10-CM

## 2023-09-25 DIAGNOSIS — M1A.0720 IDIOPATHIC CHRONIC GOUT OF LEFT FOOT WITHOUT TOPHUS: ICD-10-CM

## 2023-09-25 DIAGNOSIS — I12.9 RENAL HYPERTENSION: ICD-10-CM

## 2023-09-25 DIAGNOSIS — N04.9 NEPHROTIC SYNDROME: ICD-10-CM

## 2023-09-25 DIAGNOSIS — N18.32 STAGE 3B CHRONIC KIDNEY DISEASE: Primary | ICD-10-CM

## 2023-09-25 DIAGNOSIS — I50.812 CHRONIC RIGHT-SIDED HEART FAILURE: ICD-10-CM

## 2023-09-25 PROCEDURE — 99214 OFFICE O/P EST MOD 30 MIN: CPT | Mod: S$GLB,,, | Performed by: INTERNAL MEDICINE

## 2023-09-25 PROCEDURE — 99214 PR OFFICE/OUTPT VISIT, EST, LEVL IV, 30-39 MIN: ICD-10-PCS | Mod: S$GLB,,, | Performed by: INTERNAL MEDICINE

## 2023-09-25 RX ORDER — CLOPIDOGREL BISULFATE 75 MG/1
75 TABLET ORAL DAILY
COMMUNITY

## 2023-09-25 RX ORDER — SOTALOL HYDROCHLORIDE 80 MG/1
80 TABLET ORAL 2 TIMES DAILY
COMMUNITY
Start: 2023-08-10

## 2023-09-25 NOTE — PATIENT INSTRUCTIONS
The patient has been provided with her current level of kidney function and a discussion of the significant prerenal azotemia that has developed.  She is been asked to stop taking the furosemide until 09/28/2023, and to have repeat renal panel drawn that day to determine whether there has been a beneficial improvement in the elevated BUN.    Regardless, she is asked to return for follow-up this time in 2 months with repeat kidney lab work done before the visit.

## 2023-09-25 NOTE — PROGRESS NOTES
Pt Name: Gloria Berrios  Pt : 1950  Pt MRN: [id number]    Date:  2022    Reason for visit:     Follow-up office visit for Chronic Kidney Disease  Serum creatinine 1.61, GFR 34, CKD stage 3b.    Chief Complaint:     The patient denies any complaints today.  Patient had gastric sleeve on Tuesday May 18,2021.    Assessment & Plan:     Problem #1.  Type II Diabetes Mellitus     Assessment:    Uncontrolled again with A1c increased to 6.2 %.   Plan:    Diabetes managements and nutritional support as per Ms. Valerie Waldron, [facility].     She could not afford a SGLT2 inhibitor or injectable semaglutide.    Repeat A1c prior to the visit in 2 months      Problem #2.  Stage I Essential Hypertension     Assessment:    Controlled      Plan:    2 g sodium diet, discontinue furosemide 20 mg by mouth until instructed otherwise, sotalol 80 mg by mouth twice daily, valsartan 160 mg by mouth daily.      Problem #3.  Gout     Assessment:    Asymptomatic   Plan:    Febuxostat 40 mg by mouth daily.     Repeat uric acid level time to time as indicated.      Problem #4.  Type II Right Heart Cardiorenal Syndrome           Assessment:    Asymptomatic   Plan:    The patient is continued encouraged to keep her   appointments with the congestive heart failure clinic, and is advised that 50 ounces of fluid a day should suffice.     2 g sodium diet, discontinue furosemide 20 mg by mouth daily until instructed otherwise, sildenafil 20 mg by mouth 3 times daily, valsartan 160 mg by mouth daily.      Problem #5.  Chronic Kidney Disease Stage 3b     Assessment:    With a significant degree of prerenal azotemia developed since last visit, in the patient need full of increasing her oral fluid intake while withholding the furosemide for the time being.       Plan:    Conservative, non-dialysis, nephrologic managements.    Continue renal nutrition and fluid managements.     Continue to provide instruction and reading material regarding the  chronic kidney disease condition, and especially its associated cardiovascular risks.    Repeat renal panel in 2 months to be certain that there has not been a further decrease in GFR.    Discontinue the oral furosemide.  Repeat renal panel on Thursday, 09/28/2023.  Where the results of the repeat renal panel become available, a decision will be made regarding how to have her take the furosemide.     Return for follow-up in 2 months with repeat kidney lab work done before the visit.       Problem #6.  Anemia in Chronic Kidney Disease     Assessment:      Asymptomatic   Plan:    Repeat hemoglobin prior to the visit in 2 months.     Problem #7.  Secondary Hyperparathyroidism of Renal Origin     Assessment:    Asymptomatic   Plan:    Repeat calcium, phosphorus, albumin prior to the visit in 2 months.      Problem #8.  Nephrotic Syndrome     Assessment:      Asymptomatic   Plan:    2 g sodium diet.     Repeat random urine protein/creatinine from time to time as indicated.            HPI:     This is the 21st visit to the Outpatient [facility] in [facility] for this 73 y.o. woman referred by Dr. Blaine Askew following a hospitalization in October 2018 at [facility], during which this provider was asked to provide nephrologic input into the management of severe anasarca accompanying her underlying diabetic nephropathy with nephrotic syndrome and cardiopulmonary difficulties related to moderately advanced pulmonary hypertension.     She had a  gastric sleeve created by Dr. Seymour on 05/18/2021.    Her current status is continued influenced by severe pulmonary hypertension, and she is currently experiencing a problem with chronic pruritis that does not respond to any of the available anti-pruritis agents, either OTC or prescribed. The allopurinol was discontinued a few days ago to determine if that could be the offender, Febuxostat has been substituted.      As well, and since last visit, she is had a permanent  pacemaker installed.  She is awaiting a Watchman procedure and has been told that she may not have to take her oral anticoagulant medication once that has been accomplished.         In the clinic today for follow up of the status of her kidney function, she does not have absent appetite, nausea, chest pain, shortness of breath, lying flat to sleep at night, absent energy, swelling, or any trouble thinking.           From the standpoint of the risk factors for the development of a kidney function problem, the patient has long-standing Type 2 diabetes mellitus with fully developed diabetic nephropathy and the nephrotic syndrome.  At the same time, she has a contribution to her kidney dysfunction from long-standing hypertension.       History:           Past Medical History:   Diagnosis Date    Anemia      Arrhythmia      Atrial fibrillation      CHF (congestive heart failure)      Chronic kidney disease      Colon polyp 13     4 TUBULAR ADENOMAS    Coronary artery disease      Diabetes mellitus      Heart murmur      History of thyroid disease      Hyperlipidemia       pure hypercholestermia    Hypertension       Pure essential    Lymphoma      Pulmonary HTN      Sleep apnea       cpap at night            Past Surgical History:   Procedure Laterality Date    CARDIAC CATHETERIZATION         SECTION        COLONOSCOPY       COLONOSCOPY   2013    DILATION AND CURETTAGE OF UTERUS        ESOPHAGOGASTRODUODENOSCOPY   2013    heart stent   2018    LAPAROSCOPIC GASTRECTOMY SLEEVE AND HIATAL HERNIA REPAIR   2021    SD CORON CATH PLACEMNT,ANGIO,NO LH CATH        TOTAL THYROIDECTOMY   2019            Family History   Problem Relation Age of Onset    Heart disease Mother      Diabetes Mother      Heart failure Mother      Kidney disease Mother      Hypertension Mother      Hyperlipidemia Mother      Obesity Mother      Heart disease Father      Diabetes Father      Hypertension Father       Hyperlipidemia Father      Heart attack Father      Obesity Father      Diabetes Paternal Aunt      Hypertension Maternal Grandfather      Heart disease Maternal Grandfather      Heart attack Brother      Diabetes Brother      Heart disease Brother      Diabetes Sister      Colon cancer Neg Hx      Stomach cancer Neg Hx        Social History          Substance and Sexual Activity   Alcohol Use No      Social History          Substance and Sexual Activity   Drug Use No      Social History           Substance and Sexual Activity   Sexual Activity Yes    Partners: Male     reports being sexually active and has had partner(s) who are Male.  Social History           Tobacco Use   Smoking Status Former Smoker    Packs/day: 1.00    Years: 10.00    Pack years: 10.00    Types: Cigarettes    Quit date: 1985    Years since quittin.1   Smokeless Tobacco Never Used         Allergies:           Allergies   Allergen Reactions    Nitroglycerin Other (See Comments)       Pulmonologist told her not to ever take, contraindicated with Revatio    Diflucan [Fluconazole]              Current Outpatient Medications:     ACCU-CHEK SOFTCLIX LANCETS lancets, USE TO TEST EVERY DAY, Disp: 100 each, Rfl: 5    allopurinoL (ZYLOPRIM) 100 MG tablet, TAKE 1 TABLET BY MOUTH EVERY DAY, Disp: 90 tablet, Rfl: 1    amLODIPine (NORVASC) 10 MG tablet, Take 10 mg by mouth daily, Disp: , Rfl:     calcium citrate-vitamin D (CITRACAL +D) 200 mg-6.25 mcg (250 unit) Tablet, Take one tablet by mouth 2 (two) times daily Indications: osteoporosis, a condition of weak bones, low vitamin D levels, Disp: 180 tablet, Rfl: 3    clopidogrel (PLAVIX) 75 mg tablet, Take one tablet (75 mg total) by mouth daily, Disp: 60 tablet, Rfl: 3    denosumab (PROLIA) 60 mg/mL Syringe, Inject 1 mL (60 mg total) into the skin every 6 (six) months, Disp: 1 mL, Rfl: 0    Edarbi 80 mg Tablet, Take one tablet (80 mg total) by mouth daily, Disp: , Rfl:     ferrous sulfate 325  "(65 FE) MG tablet, Take 325 mg by mouth daily with breakfast, Disp: , Rfl:     flash glucose scanning reader (FreeStyle Elsa 2 [place]) Misc, 1 Units by Misc.(Non-Drug; Combo Route) route 3 (three) times daily before meals, Disp: 1 each, Rfl: 0    flash glucose sensor (FreeStyle Elsa 2 Sensor) Kit, 1 Units by Misc.(Non-Drug; Combo Route) route every 14 (fourteen) days, Disp: 2 kit, Rfl: 11    glipiZIDE (GlucotroL) 5 MG tablet, Take one half tablet (2.5 mg total) by mouth before breafast, Disp: 30 tablet, Rfl: 1    insulin needles, disposable, (NovoTwist) 32 x 1/5 " Needle, DAILY USE WITH TRESIBA, Disp: 100 each, Rfl: 5    lancing device with lancets Kit, Daily use. Dx: e11.9, Disp: 100 each, Rfl: 5    levothyroxine (SYNTHROID) 175 MCG tablet, Take one tablet (175 mcg total) by mouth every morning, Disp: 90 tablet, Rfl: 3    metoprolol (LOPRESSOR) 50 MG tablet, Take 25 mg by mouth 2 (two) times daily , Disp: , Rfl:     multivit-iron-FA-calcium-mins (THERA-M PLUS) 9 mg iron-400 mcg Tablet, Take 1 tablet by mouth daily, Disp: , Rfl:     ONETOUCH ULTRA BLUE TEST STRIP Strip, USE TO TEST THREE TIMES DAILY AND AS NEEDED, Disp: 100 strip, Rfl: 11    patiromer calcium sorbitex (VELTASSA) 8.4 gram powder, Take 8.4 g by mouth daily Indications: high levels of potassium in the blood, Disp: 90 each, Rfl: 3    rivaroxaban (XARELTO) 20 mg Tablet, Take one tablet (20 mg total) by mouth daily with dinner, Disp: 60 tablet, Rfl: 3    rosuvastatin (CRESTOR) 10 MG tablet, , Disp: , Rfl:     semaglutide (Ozempic) 1 mg/dose (4 mg/3 mL) Pen Injector injection, Inject one mg into the skin every 7 days, Disp: 9 mL, Rfl: 3    sildenafiL, pulm.hypertension, (REVATIO) 20 mg tablet, Take one tablet (20 mg total) by mouth 3 (three) times daily, Disp: 270 tablet, Rfl: 3    sotaloL (BETAPACE) 80 MG tablet, , Disp: , Rfl:     treprostinil (TYVASO) 1.74 mg/2.9 mL (0.6 mg/mL) Solution for Nebulization neb solution, Inhale 9 puffs into the lungs 4 " (four) times daily , Disp: , Rfl:     furosemide (LASIX) 80 MG tablet, Take 80 mg by mouth as needed , Disp: , Rfl:          ROS:      Constitutional:  Denies fever or chills   Eyes:  Denies change in visual acuity   HENT:  Denies nasal congestion or sore throat   Respiratory:  As in the history of the present illness.   Cardiovascular:  As in the history of the present illness.   GI:  As in the history of the present illness.    Musculoskeletal:  Denies back pain or joint pain   Integument:  Denies rash   Neurologic:  Denies headache, focal weakness or sensory changes   Endocrine:  Denies polyuria or polydipsia   Lymphatic:  Denies swollen glands   Psychiatric:  Denies depression or anxiety.        Physical Exam:      Vitals:       Constitutional:  Well developed, well nourished, and in no acute distress   Eyes:  PERRLA, conjunctiva normal   HENT:  Atraumatic, external ears normal, nose normal.  Neck: There is no jugular venous distension or thyromegaly.   Respiratory:  No respiratory distress, normal breath sounds, no rales, no wheezing   Cardiovascular:  Normal rate,and a regular rhythm, a 2/6 to 3/6 systolic ejection murmur at the lower left sternal border., no gallops, no rub, and bilateral pretibial edema.  GI:  Normal bowel sounds.  Musculoskeletal:  No deformities.   Neurologic:  Alert & oriented x 3, CN 2-12 normal, normal motor function, and no asterixis.   Psychiatric:  Speech and behavior appropriate.         Labs/Tests:    Date:  09/21/2023    Na/K/Cl/CO2 = 138/3.8/106/26  BUN/Creat/GFR = 133/1.84/29   Ca/Phos/Alb/PTH = 9.0/3.4/4.125  U Prot/Creat = 1.4  Gluc/A1c = 127/6.2  Hgb = 14.0      Follow Up:     Return for follow up in 6 months with repeat kidney lab work done before the visit.      This note was created using the voice recognition software currently available to the Medical Staff of the [facility] and its health care facilities. All of the best efforts undertaken to edit the product  of that use shall necessarily fall short from time to time. Viewers and reviewers of the product of its use are encouraged to contact this provider for clarification when, and if, the product message is unclear.

## 2023-10-02 DIAGNOSIS — N18.32 STAGE 3B CHRONIC KIDNEY DISEASE: Primary | ICD-10-CM

## 2023-10-11 ENCOUNTER — TELEPHONE (OUTPATIENT)
Dept: NEPHROLOGY | Facility: CLINIC | Age: 73
End: 2023-10-11
Payer: MEDICARE

## 2023-10-11 DIAGNOSIS — N18.32 STAGE 3B CHRONIC KIDNEY DISEASE: Primary | ICD-10-CM

## 2023-10-11 RX ORDER — FUROSEMIDE 20 MG/1
20 TABLET ORAL
Qty: 30 TABLET | Refills: 3
Start: 2023-10-11 | End: 2024-10-10

## 2023-10-11 NOTE — TELEPHONE ENCOUNTER
The patient has been contacted by telephone this morning to ask that she resume using compression stockings for her lower extremity edema.  She Has had discussed the use of compression stockings with a zipper as opposed to the roll-on type.     In addition, she has been asked to change the furosemide to 20 mg every 3rd day instead of every other, especially after the change to every other day after 09/28/2023 did not result in a beneficial decrease in BUN and creatinine ( 1.33 to 1.56 and 67 to 90).     She is to have a repeat renal panel done on Monday, 10/16/2023.

## 2023-11-28 ENCOUNTER — OFFICE VISIT (OUTPATIENT)
Dept: NEPHROLOGY | Facility: CLINIC | Age: 73
End: 2023-11-28
Payer: MEDICARE

## 2023-11-28 VITALS
BODY MASS INDEX: 39.69 KG/M2 | WEIGHT: 224 LBS | HEART RATE: 65 BPM | DIASTOLIC BLOOD PRESSURE: 84 MMHG | SYSTOLIC BLOOD PRESSURE: 136 MMHG | HEIGHT: 63 IN | OXYGEN SATURATION: 98 %

## 2023-11-28 DIAGNOSIS — N18.31 ANEMIA IN STAGE 3A CHRONIC KIDNEY DISEASE: ICD-10-CM

## 2023-11-28 DIAGNOSIS — E11.22 TYPE 2 DIABETES MELLITUS WITH STAGE 3A CHRONIC KIDNEY DISEASE, WITHOUT LONG-TERM CURRENT USE OF INSULIN: ICD-10-CM

## 2023-11-28 DIAGNOSIS — E11.21 DIABETIC NEPHROPATHY ASSOCIATED WITH TYPE 2 DIABETES MELLITUS: ICD-10-CM

## 2023-11-28 DIAGNOSIS — I50.812 CHRONIC RIGHT-SIDED HEART FAILURE: ICD-10-CM

## 2023-11-28 DIAGNOSIS — N04.9 NEPHROTIC SYNDROME: ICD-10-CM

## 2023-11-28 DIAGNOSIS — E66.01 MORBID (SEVERE) OBESITY DUE TO EXCESS CALORIES: ICD-10-CM

## 2023-11-28 DIAGNOSIS — D63.1 ANEMIA IN STAGE 3A CHRONIC KIDNEY DISEASE: ICD-10-CM

## 2023-11-28 DIAGNOSIS — N18.31 STAGE 3A CHRONIC KIDNEY DISEASE: Primary | ICD-10-CM

## 2023-11-28 DIAGNOSIS — N18.31 TYPE 2 DIABETES MELLITUS WITH STAGE 3A CHRONIC KIDNEY DISEASE, WITHOUT LONG-TERM CURRENT USE OF INSULIN: ICD-10-CM

## 2023-11-28 DIAGNOSIS — I12.9 RENAL HYPERTENSION: ICD-10-CM

## 2023-11-28 DIAGNOSIS — M1A.0720 IDIOPATHIC CHRONIC GOUT OF LEFT FOOT WITHOUT TOPHUS: ICD-10-CM

## 2023-11-28 DIAGNOSIS — N25.81 SECONDARY HYPERPARATHYROIDISM OF RENAL ORIGIN: ICD-10-CM

## 2023-11-28 PROCEDURE — 99214 PR OFFICE/OUTPT VISIT, EST, LEVL IV, 30-39 MIN: ICD-10-PCS | Mod: S$GLB,,, | Performed by: INTERNAL MEDICINE

## 2023-11-28 PROCEDURE — 99214 OFFICE O/P EST MOD 30 MIN: CPT | Mod: S$GLB,,, | Performed by: INTERNAL MEDICINE

## 2023-11-28 RX ORDER — NAPROXEN SODIUM 220 MG/1
81 TABLET, FILM COATED ORAL DAILY
COMMUNITY
Start: 2023-10-28

## 2023-11-28 NOTE — PATIENT INSTRUCTIONS
The patient has been provided with her current level of improved kidney function today and she is asked to return for follow-up in 4 months with repeat kidney lab work done before the visit.    However, and in view of the significantly increased urinary protein level, she is asked to repeat the random urine protein/creatinine coming [facility]

## 2023-11-28 NOTE — PROGRESS NOTES
Pt Name: Gloria Berrios  Pt : 1950  Pt MRN: [id number]    Date:  2022    Reason for visit:     Follow-up office visit for Chronic Kidney Disease  Serum creatinine 1.20, GFR 48, CKD stage 3a.    Chief Complaint:     The patient denies any complaints today.  Patient had gastric sleeve on Tuesday May 18,2021.    Assessment & Plan:     Problem #1.  Type II Diabetes Mellitus     Assessment:    Uncontrolled again with A1c increased to 6.0 %.   Plan:    Diabetes managements and nutritional support as per Ms. Valerie Waldron, [facility].     She could not afford a SGLT2 inhibitor or injectable semaglutide.    Repeat A1c prior to the visit in 4 months      Problem #2.  Stage I Essential Hypertension     Assessment:    Controlled      Plan:    2 g sodium diet, discontinue furosemide 20 mg by mouth until instructed otherwise, sotalol 80 mg by mouth twice daily, valsartan 160 mg by mouth daily.      Problem #3.  Gout     Assessment:    Asymptomatic   Plan:    Febuxostat 40 mg by mouth daily.     Repeat uric acid level time to time as indicated.      Problem #4.  Type II Right Heart Cardiorenal Syndrome           Assessment:    Asymptomatic   Plan:    The patient is continued encouraged to keep her   appointments with the congestive heart failure clinic, and is advised that 50 ounces of fluid a day should suffice.     2 g sodium diet, discontinue furosemide 20 mg by mouth daily until instructed otherwise, sildenafil 20 mg by mouth 3 times daily, valsartan 160 mg by mouth daily.      Problem #5.  Chronic Kidney Disease Stage 3a     Assessment:    With a significant degree of prerenal azotemia developed since last visit, in the patient need full of increasing her oral fluid intake while withholding the furosemide for the time being.       Plan:    Conservative, non-dialysis, nephrologic managements.    Continue renal nutrition and fluid managements.     Continue to provide instruction and reading material regarding the  chronic kidney disease condition, and especially its associated cardiovascular risks.    Return for follow-up in 4 months with repeat kidney lab work done before the visit.       Problem #6.  Anemia in Chronic Kidney Disease     Assessment:      Asymptomatic   Plan:    Repeat hemoglobin prior to the visit in 4 months.     Problem #7.  Secondary Hyperparathyroidism of Renal Origin     Assessment:    Asymptomatic   Plan:    Repeat calcium, phosphorus, albumin prior to the visit in 4 months.      Problem #8.  Nephrotic Syndrome     Assessment:      Asymptomatic, but the proteinuria considerably worsened again assuming that the lab data truly report.     Plan:    2 g sodium diet.     Repeat random urine protein/creatinine in the next 4 days and again prior to the visit in 4 months.            HPI:     This is the 22nd visit to the [facility] in [place], for this 73 y.o. woman referred by Dr. Blaine Askew following a hospitalization in October 2018 at [facility], during which this provider was asked to provide nephrologic input into the management of severe anasarca accompanying her underlying diabetic nephropathy with nephrotic syndrome and cardiopulmonary difficulties related to moderately advanced pulmonary hypertension.     She had a  gastric sleeve created by Dr. Seymour on 05/18/2021.    Her current status is continued influenced by severe pulmonary hypertension.    She continues currently experiencing a problem with chronic pruritis that does not respond to any of the available anti-pruritis agents, either OTC or prescribed.     She has a permanent pacemaker installed.  She also has had the Watchman procedure and has been told that she may not have to take her oral anticoagulant medication after 6 months from now. She is to see  12/21 for a consultation regarding anb ablative procedure for further intervention for her symptom associated atrial fibrillation.         At presentation  to the clinic today for follow up of the status of her kidney function, she does not have azotemic symptoms. Specifically, she does not have absent appetite, nausea, chest pain, shortness of breath, lying flat to sleep at night, absent energy, swelling, or any trouble thinking.           From the standpoint of the risk factors for the development of a kidney function problem, the patient has long-standing Type 2 diabetes mellitus with fully developed diabetic nephropathy and the nephrotic syndrome.  At the same time, she has a contribution to her kidney dysfunction from long-standing hypertension.       History:           Past Medical History:   Diagnosis Date    Anemia      Arrhythmia      Atrial fibrillation      CHF (congestive heart failure)      Chronic kidney disease      Colon polyp 13     4 TUBULAR ADENOMAS    Coronary artery disease      Diabetes mellitus      Heart murmur      History of thyroid disease      Hyperlipidemia       pure hypercholestermia    Hypertension       Pure essential    Lymphoma      Pulmonary HTN      Sleep apnea       cpap at night            Past Surgical History:   Procedure Laterality Date    CARDIAC CATHETERIZATION         SECTION        COLONOSCOPY       COLONOSCOPY   2013    DILATION AND CURETTAGE OF UTERUS        ESOPHAGOGASTRODUODENOSCOPY   2013    heart stent   2018    LAPAROSCOPIC GASTRECTOMY SLEEVE AND HIATAL HERNIA REPAIR   2021    VT CORON CATH PLACEMNT,ANGIO,NO LH CATH        TOTAL THYROIDECTOMY   2019            Family History   Problem Relation Age of Onset    Heart disease Mother      Diabetes Mother      Heart failure Mother      Kidney disease Mother      Hypertension Mother      Hyperlipidemia Mother      Obesity Mother      Heart disease Father      Diabetes Father      Hypertension Father      Hyperlipidemia Father      Heart attack Father      Obesity Father      Diabetes Paternal Aunt      Hypertension Maternal  Grandfather      Heart disease Maternal Grandfather      Heart attack Brother      Diabetes Brother      Heart disease Brother      Diabetes Sister      Colon cancer Neg Hx      Stomach cancer Neg Hx        Social History          Substance and Sexual Activity   Alcohol Use No      Social History          Substance and Sexual Activity   Drug Use No      Social History           Substance and Sexual Activity   Sexual Activity Yes    Partners: Male     reports being sexually active and has had partner(s) who are Male.  Social History           Tobacco Use   Smoking Status Former Smoker    Packs/day: 1.00    Years: 10.00    Pack years: 10.00    Types: Cigarettes    Quit date: 1985    Years since quittin.1   Smokeless Tobacco Never Used         Allergies:           Allergies   Allergen Reactions    Nitroglycerin Other (See Comments)       Pulmonologist told her not to ever take, contraindicated with Revatio    Diflucan [Fluconazole]              Current Outpatient Medications:     ACCU-CHEK SOFTCLIX LANCETS lancets, USE TO TEST EVERY DAY, Disp: 100 each, Rfl: 5    allopurinoL (ZYLOPRIM) 100 MG tablet, TAKE 1 TABLET BY MOUTH EVERY DAY, Disp: 90 tablet, Rfl: 1    amLODIPine (NORVASC) 10 MG tablet, Take 10 mg by mouth daily, Disp: , Rfl:     calcium citrate-vitamin D (CITRACAL +D) 200 mg-6.25 mcg (250 unit) Tablet, Take one tablet by mouth 2 (two) times daily Indications: osteoporosis, a condition of weak bones, low vitamin D levels, Disp: 180 tablet, Rfl: 3    clopidogrel (PLAVIX) 75 mg tablet, Take one tablet (75 mg total) by mouth daily, Disp: 60 tablet, Rfl: 3    denosumab (PROLIA) 60 mg/mL Syringe, Inject 1 mL (60 mg total) into the skin every 6 (six) months, Disp: 1 mL, Rfl: 0    Edarbi 80 mg Tablet, Take one tablet (80 mg total) by mouth daily, Disp: , Rfl:     ferrous sulfate 325 (65 FE) MG tablet, Take 325 mg by mouth daily with breakfast, Disp: , Rfl:     flash glucose scanning reader (FreeStyle Elsa 2  "[place]) Misc, 1 Units by Misc.(Non-Drug; Combo Route) route 3 (three) times daily before meals, Disp: 1 each, Rfl: 0    flash glucose sensor (FreeStyle Elsa 2 Sensor) Kit, 1 Units by Misc.(Non-Drug; Combo Route) route every 14 (fourteen) days, Disp: 2 kit, Rfl: 11    glipiZIDE (GlucotroL) 5 MG tablet, Take one half tablet (2.5 mg total) by mouth before breafast, Disp: 30 tablet, Rfl: 1    insulin needles, disposable, (NovoTwist) 32 x 1/5 " Needle, DAILY USE WITH TRESIBA, Disp: 100 each, Rfl: 5    lancing device with lancets Kit, Daily use. Dx: e11.9, Disp: 100 each, Rfl: 5    levothyroxine (SYNTHROID) 175 MCG tablet, Take one tablet (175 mcg total) by mouth every morning, Disp: 90 tablet, Rfl: 3    metoprolol (LOPRESSOR) 50 MG tablet, Take 25 mg by mouth 2 (two) times daily , Disp: , Rfl:     multivit-iron-FA-calcium-mins (THERA-M PLUS) 9 mg iron-400 mcg Tablet, Take 1 tablet by mouth daily, Disp: , Rfl:     ONETOUCH ULTRA BLUE TEST STRIP Strip, USE TO TEST THREE TIMES DAILY AND AS NEEDED, Disp: 100 strip, Rfl: 11    patiromer calcium sorbitex (VELTASSA) 8.4 gram powder, Take 8.4 g by mouth daily Indications: high levels of potassium in the blood, Disp: 90 each, Rfl: 3    rivaroxaban (XARELTO) 20 mg Tablet, Take one tablet (20 mg total) by mouth daily with dinner, Disp: 60 tablet, Rfl: 3    rosuvastatin (CRESTOR) 10 MG tablet, , Disp: , Rfl:     semaglutide (Ozempic) 1 mg/dose (4 mg/3 mL) Pen Injector injection, Inject one mg into the skin every 7 days, Disp: 9 mL, Rfl: 3    sildenafiL, pulm.hypertension, (REVATIO) 20 mg tablet, Take one tablet (20 mg total) by mouth 3 (three) times daily, Disp: 270 tablet, Rfl: 3    sotaloL (BETAPACE) 80 MG tablet, , Disp: , Rfl:     treprostinil (TYVASO) 1.74 mg/2.9 mL (0.6 mg/mL) Solution for Nebulization neb solution, Inhale 9 puffs into the lungs 4 (four) times daily , Disp: , Rfl:     furosemide (LASIX) 80 MG tablet, Take 80 mg by mouth as needed , Disp: , Rfl:          ROS: "      Constitutional:  Denies fever or chills   Eyes:  Denies change in visual acuity   HENT:  Denies nasal congestion or sore throat   Respiratory:  As in the history of the present illness.   Cardiovascular:  As in the history of the present illness.   GI:  As in the history of the present illness.    Musculoskeletal:  Denies back pain or joint pain   Integument:  Denies rash   Neurologic:  Denies headache, focal weakness or sensory changes   Endocrine:  Denies polyuria or polydipsia   Lymphatic:  Denies swollen glands   Psychiatric:  Denies depression or anxiety.        Physical Exam:      Vitals:       Constitutional:  Well developed, well nourished, and in no acute distress   Eyes:  PERRLA, conjunctiva normal   HENT:  Atraumatic, external ears normal, nose normal.  Neck: There is no jugular venous distension or thyromegaly.   Respiratory:  No respiratory distress, normal breath sounds, no rales, no wheezing   Cardiovascular:  Normal rate,and a regular rhythm, a 2/6 to 3/6 systolic ejection murmur at the lower left sternal border., no gallops, no rub, and no pretibial edema.  GI:  Normal bowel sounds.  Musculoskeletal:  No deformities.   Neurologic:  Alert & oriented x 3, CN 2-12 normal, normal motor function, and no asterixis.   Psychiatric:  Speech and behavior appropriate.         Labs/Tests:    Date:  11/27/2023    Na/K/Cl/CO2 = 140/4.8/109/24  BUN/Creat/GFR = 48/1.20/48   Ca/Phos/Alb/PTH = 9.6/4.5/4.3/81  U Prot/Creat = 5.0  Gluc/A1c = 126/6.0  Hgb = 13  (?)      Follow Up:     Return for follow up in 4 months with repeat kidney lab work done before the visit.      This note was created using the voice recognition software currently available to the Medical Staff of the [facility] and its health care facilities. All of the best efforts undertaken to edit the product of that use shall necessarily fall short from time to time. Viewers and reviewers of the product of its use are encouraged to contact  this provider for clarification when, and if, the product message is unclear.